# Patient Record
Sex: FEMALE | Race: WHITE
[De-identification: names, ages, dates, MRNs, and addresses within clinical notes are randomized per-mention and may not be internally consistent; named-entity substitution may affect disease eponyms.]

---

## 2017-11-21 ENCOUNTER — HOSPITAL ENCOUNTER (OUTPATIENT)
Dept: HOSPITAL 92 - MAMMO | Age: 47
Discharge: HOME | End: 2017-11-21
Attending: OBSTETRICS & GYNECOLOGY
Payer: COMMERCIAL

## 2017-11-21 DIAGNOSIS — N64.52: Primary | ICD-10-CM

## 2017-11-21 PROCEDURE — 77066 DX MAMMO INCL CAD BI: CPT

## 2017-11-21 PROCEDURE — G0204 DX MAMMO INCL CAD BI: HCPCS

## 2017-11-21 NOTE — MMO
BILATERAL DIAGNOSTIC MAMMOGRAMS:

 

HISTORY: 

This 47-year-old female presents for bilateral diagnostic mammogram.  The patient indicates a 1-time 
episode only of some bloody colored discharge from her left breast over a month ago and this has not 
recurred.  No palpable finding.

 

FINDINGS: 

Scattered areas of fibroglandular density are noted throughout both breasts.

 

This patient's mammogram is interpreted with the assistance of computer-aided detection.   

 

No old films.

 

There are scattered typically benign calcifications.  Multiple bilateral parenchymal density asymmetr
ies.

 

IMPRESSION: 

BI-RADS category 2, benign findings.  Continued routine screening.  

 

 

POS: [facility]

## 2018-12-30 NOTE — OP
DATE OF PROCEDURE:  12/28/2018



PREOPERATIVE DIAGNOSIS:  Left carpal tunnel syndrome.



POSTOPERATIVE DIAGNOSIS:  Left carpal tunnel syndrome.



PROCEDURE PERFORMED:  Left carpal tunnel release.



SURGEON:  Hola Thornton MD



ANESTHESIA:  __________ CRNA, conscious sedation with propofol augmented by a total

of 20 mL 0.5% Marcaine block __________ 



COMPLICATION:  None.



TOURNIQUET TIME:  14 minutes.



ESTIMATED BLOOD LOSS:  5 mL.



FINDINGS:  Tight transcarpal ligament __________ formation.  No flexor tenosynovitis.



DESCRIPTION OF PROCEDURE:  After successful anesthesia listed above, the limb was

prepped and draped.  We gave 8 minutes for the local Marcaine to set before we made

an incision.  The limb was prepped and draped.  Time-out was done appropriately.

The limb was exsanguinated, tourniquet inflated to 250 mmHg pressure. 

   

The incision was made 2.5 cm beginning 5 mm distal to the volar wrist flexion crease

and coursed to the Morel cardinal line in line with the ring finger/the ulnar edge

of the palmaris longus.  We then carried the incision through skin and subcutaneous

tissue, and once identified the transcarpal ligament, self-retaining retractor was

applied and we began the release at the midportion of the ligament distally

visualized, protected neurovascular bundle at the end. 

   

Once this was opened, we then reversed field on visualization, dissected the soft

tissue and skin/dermis from the transcarpal ligament visualized completely and then

under direct visualization, released the transcarpal ligament from the midportion

proximally using the same two instruments. 

   

Tourniquet was deflated at 14 minutes.  Hemostasis obtained.  3 mL was Celestone was

dripped over the nerve and the wound was closed with excellent hemostasis using

interrupted 4-0 nylon.  Bulky soft dressing was applied and the patient left the

operating room without evidence of anesthetic or operative complication. 







Job ID:  [id number]

## 2020-10-24 NOTE — RAD
PORTABLE CHEST:

10/24/20

 

HISTORY: 

Chest pain status post fall. 

 

Heart size is within normal limits for portable technique. Mediastinal structures appear unremarkable
. The lungs are clear of infiltrate. Postoperative changes of the cervical spine are seen. 

 

IMPRESSION: 

No active intrathoracic disease. 

 

POS: OFF

## 2020-10-24 NOTE — CT
CT OF CERVICAL SPINE PERFORMED WITHOUT CONTRAST ENHANCEMENT: 

10/24/20

 

HISTORY: 

Neck pain status post fall. 

 

Vertebral bodies are normal in height. There is anterior cervical fusion extending from C5 to C7. Int
ervening disc implants are noted. Facets are in normal alignment. There is no  canal stenosis. Mild b
ilateral foraminal narrowing at the C5-6 level slightly more pronounced on the right with the right p
aracentral posterior osteophyte change compressing on the right side of the cord at this level and as
sociated with some mild canal narrowing. The left foramen at C6-7 is borderline. There is no CT evide
nce for fracture. Lung apices are clear. 

 

IMPRESSION: 

No CT evidence of fracture of the cervical spine. 

 

POS: OFF

## 2020-10-24 NOTE — CT
CT OF BRAIN PERFORMED WITHOUT CONTRAST ENHANCEMENT:

10/24/20

 

HISTORY: 

Head injury status post fall.

 

The ventricular and cisternal system is within normal limits. There are no signs of intracerebral hem
orrhage or extra-axial fluid collections. 

 

IMPRESSION: 

No acute intracranial abnormalities. 

 

POS: OFF